# Patient Record
Sex: FEMALE | Race: OTHER | HISPANIC OR LATINO | URBAN - METROPOLITAN AREA
[De-identification: names, ages, dates, MRNs, and addresses within clinical notes are randomized per-mention and may not be internally consistent; named-entity substitution may affect disease eponyms.]

---

## 2019-11-23 ENCOUNTER — INPATIENT (INPATIENT)
Facility: HOSPITAL | Age: 84
LOS: 2 days | Discharge: HOME | DRG: 195 | End: 2019-11-26
Attending: INTERNAL MEDICINE | Admitting: INTERNAL MEDICINE
Payer: COMMERCIAL

## 2019-11-23 VITALS
OXYGEN SATURATION: 94 % | RESPIRATION RATE: 16 BRPM | HEART RATE: 72 BPM | WEIGHT: 186.07 LBS | DIASTOLIC BLOOD PRESSURE: 75 MMHG | TEMPERATURE: 98 F | SYSTOLIC BLOOD PRESSURE: 129 MMHG

## 2019-11-23 DIAGNOSIS — I10 ESSENTIAL (PRIMARY) HYPERTENSION: ICD-10-CM

## 2019-11-23 DIAGNOSIS — Z29.9 ENCOUNTER FOR PROPHYLACTIC MEASURES, UNSPECIFIED: ICD-10-CM

## 2019-11-23 DIAGNOSIS — J18.9 PNEUMONIA, UNSPECIFIED ORGANISM: ICD-10-CM

## 2019-11-23 LAB
ALBUMIN SERPL ELPH-MCNC: 3.3 G/DL — LOW (ref 3.5–5)
ALP SERPL-CCNC: 50 U/L — SIGNIFICANT CHANGE UP (ref 40–120)
ALT FLD-CCNC: 18 U/L DA — SIGNIFICANT CHANGE UP (ref 10–60)
ANION GAP SERPL CALC-SCNC: 7 MMOL/L — SIGNIFICANT CHANGE UP (ref 5–17)
APPEARANCE UR: CLEAR — SIGNIFICANT CHANGE UP
APTT BLD: 31.3 SEC — SIGNIFICANT CHANGE UP (ref 27.5–36.3)
AST SERPL-CCNC: 13 U/L — SIGNIFICANT CHANGE UP (ref 10–40)
BASOPHILS # BLD AUTO: 0.03 K/UL — SIGNIFICANT CHANGE UP (ref 0–0.2)
BASOPHILS NFR BLD AUTO: 0.4 % — SIGNIFICANT CHANGE UP (ref 0–2)
BILIRUB SERPL-MCNC: 0.5 MG/DL — SIGNIFICANT CHANGE UP (ref 0.2–1.2)
BILIRUB UR-MCNC: NEGATIVE — SIGNIFICANT CHANGE UP
BUN SERPL-MCNC: 18 MG/DL — SIGNIFICANT CHANGE UP (ref 7–18)
CALCIUM SERPL-MCNC: 8.9 MG/DL — SIGNIFICANT CHANGE UP (ref 8.4–10.5)
CHLORIDE SERPL-SCNC: 101 MMOL/L — SIGNIFICANT CHANGE UP (ref 96–108)
CO2 SERPL-SCNC: 28 MMOL/L — SIGNIFICANT CHANGE UP (ref 22–31)
COLOR SPEC: YELLOW — SIGNIFICANT CHANGE UP
CREAT SERPL-MCNC: 1.1 MG/DL — SIGNIFICANT CHANGE UP (ref 0.5–1.3)
DIFF PNL FLD: ABNORMAL
EOSINOPHIL # BLD AUTO: 0.09 K/UL — SIGNIFICANT CHANGE UP (ref 0–0.5)
EOSINOPHIL NFR BLD AUTO: 1.2 % — SIGNIFICANT CHANGE UP (ref 0–6)
FLU A RESULT: SIGNIFICANT CHANGE UP
FLU A RESULT: SIGNIFICANT CHANGE UP
FLUAV AG NPH QL: SIGNIFICANT CHANGE UP
FLUBV AG NPH QL: SIGNIFICANT CHANGE UP
GLUCOSE SERPL-MCNC: 92 MG/DL — SIGNIFICANT CHANGE UP (ref 70–99)
GLUCOSE UR QL: NEGATIVE — SIGNIFICANT CHANGE UP
HCT VFR BLD CALC: 34.7 % — SIGNIFICANT CHANGE UP (ref 34.5–45)
HGB BLD-MCNC: 11.8 G/DL — SIGNIFICANT CHANGE UP (ref 11.5–15.5)
IMM GRANULOCYTES NFR BLD AUTO: 0.3 % — SIGNIFICANT CHANGE UP (ref 0–1.5)
INR BLD: 1.18 RATIO — HIGH (ref 0.88–1.16)
KETONES UR-MCNC: NEGATIVE — SIGNIFICANT CHANGE UP
LACTATE SERPL-SCNC: 1.3 MMOL/L — SIGNIFICANT CHANGE UP (ref 0.7–2)
LEUKOCYTE ESTERASE UR-ACNC: NEGATIVE — SIGNIFICANT CHANGE UP
LYMPHOCYTES # BLD AUTO: 1.93 K/UL — SIGNIFICANT CHANGE UP (ref 1–3.3)
LYMPHOCYTES # BLD AUTO: 25.4 % — SIGNIFICANT CHANGE UP (ref 13–44)
MCHC RBC-ENTMCNC: 33.1 PG — SIGNIFICANT CHANGE UP (ref 27–34)
MCHC RBC-ENTMCNC: 34 GM/DL — SIGNIFICANT CHANGE UP (ref 32–36)
MCV RBC AUTO: 97.5 FL — SIGNIFICANT CHANGE UP (ref 80–100)
MONOCYTES # BLD AUTO: 0.79 K/UL — SIGNIFICANT CHANGE UP (ref 0–0.9)
MONOCYTES NFR BLD AUTO: 10.4 % — SIGNIFICANT CHANGE UP (ref 2–14)
NEUTROPHILS # BLD AUTO: 4.74 K/UL — SIGNIFICANT CHANGE UP (ref 1.8–7.4)
NEUTROPHILS NFR BLD AUTO: 62.3 % — SIGNIFICANT CHANGE UP (ref 43–77)
NITRITE UR-MCNC: NEGATIVE — SIGNIFICANT CHANGE UP
NRBC # BLD: 0 /100 WBCS — SIGNIFICANT CHANGE UP (ref 0–0)
PH UR: 7 — SIGNIFICANT CHANGE UP (ref 5–8)
PLATELET # BLD AUTO: 142 K/UL — LOW (ref 150–400)
POTASSIUM SERPL-MCNC: 3.8 MMOL/L — SIGNIFICANT CHANGE UP (ref 3.5–5.3)
POTASSIUM SERPL-SCNC: 3.8 MMOL/L — SIGNIFICANT CHANGE UP (ref 3.5–5.3)
PROT SERPL-MCNC: 6.8 G/DL — SIGNIFICANT CHANGE UP (ref 6–8.3)
PROT UR-MCNC: NEGATIVE — SIGNIFICANT CHANGE UP
PROTHROM AB SERPL-ACNC: 13.2 SEC — HIGH (ref 10–12.9)
RBC # BLD: 3.56 M/UL — LOW (ref 3.8–5.2)
RBC # FLD: 14 % — SIGNIFICANT CHANGE UP (ref 10.3–14.5)
RSV RESULT: DETECTED
RSV RNA RESP QL NAA+PROBE: DETECTED
SODIUM SERPL-SCNC: 136 MMOL/L — SIGNIFICANT CHANGE UP (ref 135–145)
SP GR SPEC: 1.01 — SIGNIFICANT CHANGE UP (ref 1.01–1.02)
UROBILINOGEN FLD QL: NEGATIVE — SIGNIFICANT CHANGE UP
WBC # BLD: 7.6 K/UL — SIGNIFICANT CHANGE UP (ref 3.8–10.5)
WBC # FLD AUTO: 7.6 K/UL — SIGNIFICANT CHANGE UP (ref 3.8–10.5)

## 2019-11-23 PROCEDURE — 99285 EMERGENCY DEPT VISIT HI MDM: CPT

## 2019-11-23 PROCEDURE — 71046 X-RAY EXAM CHEST 2 VIEWS: CPT | Mod: 26

## 2019-11-23 RX ORDER — OXYBUTYNIN CHLORIDE 5 MG
5 TABLET ORAL DAILY
Refills: 0 | Status: DISCONTINUED | OUTPATIENT
Start: 2019-11-23 | End: 2019-11-26

## 2019-11-23 RX ORDER — BENAZEPRIL HYDROCHLORIDE 40 MG/1
0 TABLET ORAL
Qty: 90 | Refills: 0 | DISCHARGE

## 2019-11-23 RX ORDER — OXYBUTYNIN CHLORIDE 5 MG
5 TABLET ORAL DAILY
Refills: 0 | Status: DISCONTINUED | OUTPATIENT
Start: 2019-11-23 | End: 2019-11-23

## 2019-11-23 RX ORDER — BRIMONIDINE TARTRATE 2 MG/MG
0 SOLUTION/ DROPS OPHTHALMIC
Qty: 5 | Refills: 0 | DISCHARGE

## 2019-11-23 RX ORDER — AMLODIPINE BESYLATE 2.5 MG/1
2.5 TABLET ORAL DAILY
Refills: 0 | Status: DISCONTINUED | OUTPATIENT
Start: 2019-11-23 | End: 2019-11-26

## 2019-11-23 RX ORDER — AZITHROMYCIN 500 MG/1
500 TABLET, FILM COATED ORAL ONCE
Refills: 0 | Status: COMPLETED | OUTPATIENT
Start: 2019-11-23 | End: 2019-11-23

## 2019-11-23 RX ORDER — BRIMONIDINE TARTRATE 2 MG/MG
1 SOLUTION/ DROPS OPHTHALMIC DAILY
Refills: 0 | Status: DISCONTINUED | OUTPATIENT
Start: 2019-11-23 | End: 2019-11-26

## 2019-11-23 RX ORDER — HEPARIN SODIUM 5000 [USP'U]/ML
5000 INJECTION INTRAVENOUS; SUBCUTANEOUS EVERY 12 HOURS
Refills: 0 | Status: DISCONTINUED | OUTPATIENT
Start: 2019-11-23 | End: 2019-11-26

## 2019-11-23 RX ORDER — LISINOPRIL 2.5 MG/1
10 TABLET ORAL DAILY
Refills: 0 | Status: DISCONTINUED | OUTPATIENT
Start: 2019-11-23 | End: 2019-11-26

## 2019-11-23 RX ORDER — ALENDRONATE SODIUM 70 MG/1
0 TABLET ORAL
Qty: 4 | Refills: 0 | DISCHARGE

## 2019-11-23 RX ORDER — OXYBUTYNIN CHLORIDE 5 MG
0 TABLET ORAL
Qty: 180 | Refills: 0 | DISCHARGE

## 2019-11-23 RX ORDER — CEFTRIAXONE 500 MG/1
1000 INJECTION, POWDER, FOR SOLUTION INTRAMUSCULAR; INTRAVENOUS EVERY 24 HOURS
Refills: 0 | Status: DISCONTINUED | OUTPATIENT
Start: 2019-11-24 | End: 2019-11-26

## 2019-11-23 RX ORDER — AZITHROMYCIN 500 MG/1
500 TABLET, FILM COATED ORAL EVERY 24 HOURS
Refills: 0 | Status: DISCONTINUED | OUTPATIENT
Start: 2019-11-24 | End: 2019-11-26

## 2019-11-23 RX ORDER — CEFTRIAXONE 500 MG/1
1000 INJECTION, POWDER, FOR SOLUTION INTRAMUSCULAR; INTRAVENOUS ONCE
Refills: 0 | Status: COMPLETED | OUTPATIENT
Start: 2019-11-23 | End: 2019-11-23

## 2019-11-23 RX ADMIN — AZITHROMYCIN 255 MILLIGRAM(S): 500 TABLET, FILM COATED ORAL at 16:20

## 2019-11-23 RX ADMIN — CEFTRIAXONE 100 MILLIGRAM(S): 500 INJECTION, POWDER, FOR SOLUTION INTRAMUSCULAR; INTRAVENOUS at 17:20

## 2019-11-23 NOTE — H&P ADULT - ASSESSMENT
89 F with PMH of HTn, arthritis p/w with difficulty breathing and cough with increased phlegm. She started having cough last week with sputum production, was seen by primary care doctor on tuesday, was prescribed levaquin.    CXR showed age undetermined infiltrates. Might be a resolving Pneumonia.  no leucocytosis, no fever, no tachycardia.  EKG showed RBB.     She is being admitted for Pneumonia. 89 F with PMH of HTn, arthritis p/w with difficulty breathing and cough with increased phlegm. She started having cough last week with sputum production, was seen by primary care doctor on tuesday, was prescribed levaquin.    CXR showed age undetermined infiltrates. Might be a resolving Pneumonia.  no leucocytosis, no fever, no tachycardia.  EKG showed RBB.     She is being admitted for Pneumonia.    GOC: FULL CODE

## 2019-11-23 NOTE — H&P ADULT - NSHPSOCIALHISTORY_GEN_ALL_CORE
Denies smoking, drinking ALcohol, illicit drug use Denies smoking, drinking Alcohol, illicit drug use

## 2019-11-23 NOTE — ED PROVIDER NOTE - CHPI ED SYMPTOMS POS
CHEST CONGESTION/DYSPNEA ON EXERTION/NASAL CONGESTION/COUGH/SHORTNESS OF BREATH/DIFFICULTY BREATHING

## 2019-11-23 NOTE — H&P ADULT - HISTORY OF PRESENT ILLNESS
89 F with PMH of HTn, arthritis p/w with difficulty breathing and cough with increased phlegm. She started having cough last week with sputum production, was seen by primary care doctor on tuesday, was prescribed levaquin. Last night she had SOb, was finding difficulty in breathing. It was associated with increased cough and sputum production. Phlegm is moderate amount and white in color. Has wheezing during breathing and pleuritic chest pain. She is also complaining of dryness inside the nose. She denies fever, chest pain, palpitation, dizziness, headache, burning urination, abdominal pain, nausea, vomiting. 89 F with PMH of HTn, arthritis p/w with difficulty breathing and cough with increased phlegm. She started having cough last week with sputum production, was seen by primary care doctor on tuesday, was prescribed levaquin. Last night she had SOb, was finding difficulty in breathing. It was associated with increased cough and sputum production. Phlegm is moderate amount and white in color. Has wheezing during breathing and pleuritic chest pain. She is also complaining of dryness inside the nose. She denies fever, chest pain, palpitation, dizziness, headache, burning urination, abdominal pain, nausea, vomiting.     Patient is Full code 89 F with PMH of HTN, arthritis p/w with difficulty breathing and cough with increased phlegm. She started having cough last week with sputum production, was seen by primary care doctor on tuesday, was prescribed levaquin. Last night she had SOB, was finding difficulty in breathing. It was associated with increased cough and sputum production. Phlegm is moderate amount and white in color. Has wheezing during breathing and pleuritic chest pain. She is also complaining of dryness inside the nose. She denies fever, palpitation, dizziness, headache, burning urination, abdominal pain, nausea, vomiting.     GOC: Patient is FULL CODE

## 2019-11-23 NOTE — ED PROVIDER NOTE - OBJECTIVE STATEMENT
pt with cough for several days  on levaquin but still coughing and feeling unwell. pt with cough for several days  on levaquin but still coughing and feeling unwell.  feels week and states coughing with phlegm

## 2019-11-23 NOTE — ED PROVIDER NOTE - CLINICAL SUMMARY MEDICAL DECISION MAKING FREE TEXT BOX
pt on levaquin for presumed bronchitis but feeling that she is getting worse, concern for pneumonia  will get xray, labs and admit  family at bedside and aware of plan

## 2019-11-23 NOTE — H&P ADULT - NSHPPHYSICALEXAM_GEN_ALL_CORE
Vital Signs (24 Hrs):  T(C): 36.9 (11-23-19 @ 16:02), Max: 36.9 (11-23-19 @ 16:02)  HR: 66 (11-23-19 @ 16:02) (66 - 72)  BP: 165/65 (11-23-19 @ 16:02) (129/75 - 165/65)  RR: 16 (11-23-19 @ 16:02) (16 - 16)  SpO2: 95% (11-23-19 @ 16:02) (94% - 95%)

## 2019-11-23 NOTE — H&P ADULT - PROBLEM SELECTOR PLAN 3
IMPROVE VTE Individual Risk Assessment    RISK                                                                Points    [  ] Previous VTE                                                  3    [  ] Thrombophilia                                               2    [  ] Lower limb paralysis                                      2        (unable to hold up >15 seconds)      [  ] Current Cancer                                              2         (within 6 months)  [ 1 ] Immobilization > 24 hrs                                1  [  ] ICU/CCU stay > 24 hours                              1  [  1] Age > 60                                                      1  IMPROVE VTE Score _______2__  IMPROVE Score 2-3: At risk, pharmacologic VTE prophylaxis is indicated for most patients (in the absence of a contraindication)  started on sub cut heparin

## 2019-11-23 NOTE — H&P ADULT - PROBLEM SELECTOR PLAN 1
P/W cough and shortness of breath.  Had symptoms since last week and was treated with levaquin as outpatient  No fever, leucocytosis, tachycardia.  CXR showed b/l interstitial prominence.  ? resolving PNA.  Will start on Zithro and rocephin  f/u sputum cultures and blood cultures P/W cough and shortness of breath.  Had symptoms since last week and was treated with levaquin as outpatient  No fever, leucocytosis, tachycardia.  CXR showed b/l interstitial prominence.  ? resolving PNA.  RSV positive, on droplet precaution  Will start on Zithro and rocephin  f/u sputum cultures and blood cultures  Pulm Dr Castañeda P/W cough and shortness of breath.  Had symptoms since last week and was treated with levaquin as outpatient  No fever, leucocytosis, tachycardia.  CXR showed b/l interstitial prominence.  Suspect resolving PNA.  RSV positive, on droplet precaution  Will start on Zithro and rocephin  f/u sputum cultures and blood cultures  Pulm Dr Castañeda

## 2019-11-24 LAB
ALBUMIN SERPL ELPH-MCNC: 3.4 G/DL — LOW (ref 3.5–5)
ALP SERPL-CCNC: 49 U/L — SIGNIFICANT CHANGE UP (ref 40–120)
ALT FLD-CCNC: 17 U/L DA — SIGNIFICANT CHANGE UP (ref 10–60)
ANION GAP SERPL CALC-SCNC: 4 MMOL/L — LOW (ref 5–17)
APPEARANCE UR: CLEAR — SIGNIFICANT CHANGE UP
AST SERPL-CCNC: 10 U/L — SIGNIFICANT CHANGE UP (ref 10–40)
BASOPHILS # BLD AUTO: 0.04 K/UL — SIGNIFICANT CHANGE UP (ref 0–0.2)
BASOPHILS NFR BLD AUTO: 0.6 % — SIGNIFICANT CHANGE UP (ref 0–2)
BILIRUB SERPL-MCNC: 0.7 MG/DL — SIGNIFICANT CHANGE UP (ref 0.2–1.2)
BILIRUB UR-MCNC: NEGATIVE — SIGNIFICANT CHANGE UP
BUN SERPL-MCNC: 18 MG/DL — SIGNIFICANT CHANGE UP (ref 7–18)
CALCIUM SERPL-MCNC: 8.6 MG/DL — SIGNIFICANT CHANGE UP (ref 8.4–10.5)
CHLORIDE SERPL-SCNC: 105 MMOL/L — SIGNIFICANT CHANGE UP (ref 96–108)
CHOLEST SERPL-MCNC: 147 MG/DL — SIGNIFICANT CHANGE UP (ref 10–199)
CO2 SERPL-SCNC: 29 MMOL/L — SIGNIFICANT CHANGE UP (ref 22–31)
COLOR SPEC: YELLOW — SIGNIFICANT CHANGE UP
CREAT SERPL-MCNC: 1 MG/DL — SIGNIFICANT CHANGE UP (ref 0.5–1.3)
CULTURE RESULTS: SIGNIFICANT CHANGE UP
DIFF PNL FLD: NEGATIVE — SIGNIFICANT CHANGE UP
EOSINOPHIL # BLD AUTO: 0.17 K/UL — SIGNIFICANT CHANGE UP (ref 0–0.5)
EOSINOPHIL NFR BLD AUTO: 2.4 % — SIGNIFICANT CHANGE UP (ref 0–6)
ESTIMATED AVERAGE GLUCOSE: 123 MG/DL — HIGH (ref 68–114)
FOLATE SERPL-MCNC: 9.4 NG/ML — SIGNIFICANT CHANGE UP
GLUCOSE SERPL-MCNC: 93 MG/DL — SIGNIFICANT CHANGE UP (ref 70–99)
GLUCOSE UR QL: NEGATIVE — SIGNIFICANT CHANGE UP
GRAM STN FLD: SIGNIFICANT CHANGE UP
HBA1C BLD-MCNC: 5.9 % — HIGH (ref 4–5.6)
HCT VFR BLD CALC: 34.6 % — SIGNIFICANT CHANGE UP (ref 34.5–45)
HDLC SERPL-MCNC: 45 MG/DL — LOW
HGB BLD-MCNC: 11.9 G/DL — SIGNIFICANT CHANGE UP (ref 11.5–15.5)
IMM GRANULOCYTES NFR BLD AUTO: 0.3 % — SIGNIFICANT CHANGE UP (ref 0–1.5)
KETONES UR-MCNC: NEGATIVE — SIGNIFICANT CHANGE UP
LEUKOCYTE ESTERASE UR-ACNC: NEGATIVE — SIGNIFICANT CHANGE UP
LIPID PNL WITH DIRECT LDL SERPL: 86 MG/DL — SIGNIFICANT CHANGE UP
LYMPHOCYTES # BLD AUTO: 2.22 K/UL — SIGNIFICANT CHANGE UP (ref 1–3.3)
LYMPHOCYTES # BLD AUTO: 31.6 % — SIGNIFICANT CHANGE UP (ref 13–44)
MAGNESIUM SERPL-MCNC: 2.2 MG/DL — SIGNIFICANT CHANGE UP (ref 1.6–2.6)
MCHC RBC-ENTMCNC: 33.2 PG — SIGNIFICANT CHANGE UP (ref 27–34)
MCHC RBC-ENTMCNC: 34.4 GM/DL — SIGNIFICANT CHANGE UP (ref 32–36)
MCV RBC AUTO: 96.6 FL — SIGNIFICANT CHANGE UP (ref 80–100)
MONOCYTES # BLD AUTO: 0.69 K/UL — SIGNIFICANT CHANGE UP (ref 0–0.9)
MONOCYTES NFR BLD AUTO: 9.8 % — SIGNIFICANT CHANGE UP (ref 2–14)
NEUTROPHILS # BLD AUTO: 3.88 K/UL — SIGNIFICANT CHANGE UP (ref 1.8–7.4)
NEUTROPHILS NFR BLD AUTO: 55.3 % — SIGNIFICANT CHANGE UP (ref 43–77)
NITRITE UR-MCNC: NEGATIVE — SIGNIFICANT CHANGE UP
NRBC # BLD: 0 /100 WBCS — SIGNIFICANT CHANGE UP (ref 0–0)
PH UR: 6.5 — SIGNIFICANT CHANGE UP (ref 5–8)
PHOSPHATE SERPL-MCNC: 3.1 MG/DL — SIGNIFICANT CHANGE UP (ref 2.5–4.5)
PLATELET # BLD AUTO: 140 K/UL — LOW (ref 150–400)
POTASSIUM SERPL-MCNC: 3.3 MMOL/L — LOW (ref 3.5–5.3)
POTASSIUM SERPL-SCNC: 3.3 MMOL/L — LOW (ref 3.5–5.3)
PROT SERPL-MCNC: 6.6 G/DL — SIGNIFICANT CHANGE UP (ref 6–8.3)
PROT UR-MCNC: NEGATIVE — SIGNIFICANT CHANGE UP
RBC # BLD: 3.58 M/UL — LOW (ref 3.8–5.2)
RBC # FLD: 13.8 % — SIGNIFICANT CHANGE UP (ref 10.3–14.5)
SODIUM SERPL-SCNC: 138 MMOL/L — SIGNIFICANT CHANGE UP (ref 135–145)
SP GR SPEC: 1.01 — SIGNIFICANT CHANGE UP (ref 1.01–1.02)
SPECIMEN SOURCE: SIGNIFICANT CHANGE UP
SPECIMEN SOURCE: SIGNIFICANT CHANGE UP
TOTAL CHOLESTEROL/HDL RATIO MEASUREMENT: 3.3 RATIO — SIGNIFICANT CHANGE UP (ref 3.3–7.1)
TRIGL SERPL-MCNC: 82 MG/DL — SIGNIFICANT CHANGE UP (ref 10–149)
TSH SERPL-MCNC: 3.67 UU/ML — SIGNIFICANT CHANGE UP (ref 0.34–4.82)
UROBILINOGEN FLD QL: NEGATIVE — SIGNIFICANT CHANGE UP
VIT B12 SERPL-MCNC: 393 PG/ML — SIGNIFICANT CHANGE UP (ref 232–1245)
WBC # BLD: 7.02 K/UL — SIGNIFICANT CHANGE UP (ref 3.8–10.5)
WBC # FLD AUTO: 7.02 K/UL — SIGNIFICANT CHANGE UP (ref 3.8–10.5)

## 2019-11-24 RX ORDER — ASPIRIN/CALCIUM CARB/MAGNESIUM 324 MG
81 TABLET ORAL DAILY
Refills: 0 | Status: DISCONTINUED | OUTPATIENT
Start: 2019-11-24 | End: 2019-11-26

## 2019-11-24 RX ORDER — LEVOTHYROXINE SODIUM 125 MCG
150 TABLET ORAL DAILY
Refills: 0 | Status: DISCONTINUED | OUTPATIENT
Start: 2019-11-24 | End: 2019-11-26

## 2019-11-24 RX ADMIN — Medication 5 MILLIGRAM(S): at 11:34

## 2019-11-24 RX ADMIN — CEFTRIAXONE 100 MILLIGRAM(S): 500 INJECTION, POWDER, FOR SOLUTION INTRAMUSCULAR; INTRAVENOUS at 17:43

## 2019-11-24 RX ADMIN — BRIMONIDINE TARTRATE 1 DROP(S): 2 SOLUTION/ DROPS OPHTHALMIC at 11:34

## 2019-11-24 RX ADMIN — AMLODIPINE BESYLATE 2.5 MILLIGRAM(S): 2.5 TABLET ORAL at 06:29

## 2019-11-24 RX ADMIN — AZITHROMYCIN 255 MILLIGRAM(S): 500 TABLET, FILM COATED ORAL at 16:22

## 2019-11-24 RX ADMIN — Medication 81 MILLIGRAM(S): at 13:32

## 2019-11-24 RX ADMIN — LISINOPRIL 10 MILLIGRAM(S): 2.5 TABLET ORAL at 06:29

## 2019-11-24 RX ADMIN — HEPARIN SODIUM 5000 UNIT(S): 5000 INJECTION INTRAVENOUS; SUBCUTANEOUS at 17:43

## 2019-11-24 RX ADMIN — HEPARIN SODIUM 5000 UNIT(S): 5000 INJECTION INTRAVENOUS; SUBCUTANEOUS at 06:29

## 2019-11-24 NOTE — CONSULT NOTE ADULT - SUBJECTIVE AND OBJECTIVE BOX
PULMONARY CONSULT NOTE      ROGER GIL  [id number]    Patient is a 89y old  Female who presents with a chief complaint of Cough and difficulty breathing (2019 07:02)     History of Present Illness:  Reason for Admission: Cough and difficulty breathing	  History of Present Illness: 	  89 F with PMH of HTN, arthritis p/w with difficulty breathing and cough with increased phlegm. She started having cough last week with sputum production, was seen by primary care doctor on tuesday, was prescribed levaquin. Last night she had SOB, was finding difficulty in breathing. It was associated with increased cough and sputum production. Phlegm is moderate amount and white in color. Has wheezing during breathing and pleuritic chest pain. She is also complaining of dryness inside the nose. She denies fever, palpitation, dizziness, headache, burning urination, abdominal pain, nausea, vomiting    HISTORY OF PRESENT ILLNESS:As above ,awake , alert ,lying in bed in NAD.    MEDICATIONS  (STANDING):  amLODIPine   Tablet 2.5 milliGRAM(s) Oral daily  azithromycin  IVPB 500 milliGRAM(s) IV Intermittent every 24 hours  brimonidine 0.2% Ophthalmic Solution 1 Drop(s) Both EYES daily  cefTRIAXone   IVPB 1000 milliGRAM(s) IV Intermittent every 24 hours  heparin  Injectable 5000 Unit(s) SubCutaneous every 12 hours  lisinopril 10 milliGRAM(s) Oral daily  oxybutynin XL 5 milliGRAM(s) Oral daily      MEDICATIONS  (PRN):  guaiFENesin    Syrup 100 milliGRAM(s) Oral every 6 hours PRN Cough      Allergies    sulfa drugs (Hives)    Intolerances        PAST MEDICAL & SURGICAL HISTORY:  Arthritis  Hypertension  No significant past surgical history      FAMILY HISTORY:      SOCIAL HISTORY  Smoking History:     REVIEW OF SYSTEMS:    CONSTITUTIONAL:  No fevers, chills, sweats    HEENT:  Eyes:  No diplopia or blurred vision. ENT:  No earache, sore throat or runny nose.    CARDIOVASCULAR:  No pressure, squeezing, tightness, or heaviness about the chest; no palpitations.    RESPIRATORY:  Per HPI    GASTROINTESTINAL:  No abdominal pain, nausea, vomiting or diarrhea.    GENITOURINARY:  No dysuria, frequency or urgency.    NEUROLOGIC:  No paresthesias, fasciculations, seizures or weakness.    PSYCHIATRIC:  No disorder of thought or mood.    Vital Signs Last 24 Hrs  T(C): 36.7 (2019 08:09), Max: 36.9 (2019 16:02)  T(F): 98 (2019 08:09), Max: 98.4 (2019 16:02)  HR: 60 (2019 08:09) (55 - 72)  BP: 144/45 (2019 08:09) (129/75 - 187/61)  BP(mean): --  RR: 18 (2019 08:09) (16 - 18)  SpO2: 94% (2019 08:09) (94% - 99%)  I&O's Detail      PHYSICAL EXAMINATION:    GENERAL: The patient is a well-developed, well-nourished _____in no apparent distress.     HEENT: Head is normocephalic and atraumatic. Extraocular muscles are intact. Mucous membranes are moist.     NECK: Supple.     LUNGS: + whezes    HEART: Regular rate and rhythm without murmur.    ABDOMEN: Soft, nontender, and nondistended.  No hepatosplenomegaly is noted.    EXTREMITIES: Without any cyanosis, clubbing, rash, lesions or edema.    NEUROLOGIC: Grossly intact.      LABS:                        11.9   7.02  )-----------( 140      ( 2019 07:19 )             34.6     11-24    138  |  105  |  18  ----------------------------<  93  3.3<L>   |  29  |  1.00    Ca    8.6      2019 07:19  Phos  3.1       Mg     2.2         TPro  6.6  /  Alb  3.4<L>  /  TBili  0.7  /  DBili  x   /  AST  10  /  ALT  17  /  AlkPhos  49  11-24    PT/INR - ( 2019 13:28 )   PT: 13.2 sec;   INR: 1.18 ratio         PTT - ( 2019 13:28 )  PTT:31.3 sec  Urinalysis Basic - ( 2019 07:11 )    Color: Yellow / Appearance: Clear / S.010 / pH: x  Gluc: x / Ketone: Negative  / Bili: Negative / Urobili: Negative   Blood: x / Protein: Negative / Nitrite: Negative   Leuk Esterase: Negative / RBC: x / WBC x   Sq Epi: x / Non Sq Epi: x / Bacteria: x                Lactate, Blood: 1.3 mmol/L (19 @ 13:26)        MICROBIOLOGY:    RADIOLOGY & ADDITIONAL STUDIES:    CXR:  < from: Xray Chest 2 Views PA/Lat (19 @ 15:09) >  IMPRESSION: Bilateral interstitial prominence, which may be of an acute   or chronic etiology. Clinical correlation and follow-up suggested.    < end of copied text >    Ct scan chest:    ekg;    echo: PULMONARY CONSULT NOTE      ROGER GIL  MRN-[id number]    Patient is a 89y old  Female who presents with a chief complaint of Cough and difficulty breathing (2019 07:02)     History of Present Illness:  Reason for Admission: Cough and difficulty breathing	  History of Present Illness: 	  89 F with PMH of HTN, arthritis p/w with difficulty breathing and cough with increased phlegm. She started having cough last week with sputum production, was seen by primary care doctor on tuesday, was prescribed levaquin. Last night she had SOB, was finding difficulty in breathing. It was associated with increased cough and sputum production. Phlegm is moderate amount and white in color. Has wheezing during breathing and pleuritic chest pain. She is also complaining of dryness inside the nose. She denies fever, palpitation, dizziness, headache, burning urination, abdominal pain, nausea, vomiting    HISTORY OF PRESENT ILLNESS:As above ,awake , alert , out of bed in NAD.    MEDICATIONS  (STANDING):  amLODIPine   Tablet 2.5 milliGRAM(s) Oral daily  azithromycin  IVPB 500 milliGRAM(s) IV Intermittent every 24 hours  brimonidine 0.2% Ophthalmic Solution 1 Drop(s) Both EYES daily  cefTRIAXone   IVPB 1000 milliGRAM(s) IV Intermittent every 24 hours  heparin  Injectable 5000 Unit(s) SubCutaneous every 12 hours  lisinopril 10 milliGRAM(s) Oral daily  oxybutynin XL 5 milliGRAM(s) Oral daily      MEDICATIONS  (PRN):  guaiFENesin    Syrup 100 milliGRAM(s) Oral every 6 hours PRN Cough      Allergies    sulfa drugs (Hives)    Intolerances        PAST MEDICAL & SURGICAL HISTORY:  Arthritis  Hypertension  No significant past surgical history      FAMILY HISTORY:      SOCIAL HISTORY  Smoking History:     REVIEW OF SYSTEMS:    CONSTITUTIONAL:  No fevers, chills, sweats    HEENT:  Eyes:  No diplopia or blurred vision. ENT:  No earache, sore throat or runny nose.    CARDIOVASCULAR:  No pressure, squeezing, tightness, or heaviness about the chest; no palpitations.    RESPIRATORY:  Per HPI    GASTROINTESTINAL:  No abdominal pain, nausea, vomiting or diarrhea.    GENITOURINARY:  No dysuria, frequency or urgency.    NEUROLOGIC:  No paresthesias, fasciculations, seizures or weakness.    PSYCHIATRIC:  No disorder of thought or mood.    Vital Signs Last 24 Hrs  T(C): 36.7 (2019 08:09), Max: 36.9 (2019 16:02)  T(F): 98 (2019 08:09), Max: 98.4 (2019 16:02)  HR: 60 (2019 08:09) (55 - 72)  BP: 144/45 (2019 08:09) (129/75 - 187/61)  BP(mean): --  RR: 18 (2019 08:09) (16 - 18)  SpO2: 94% (2019 08:09) (94% - 99%)  I&O's Detail      PHYSICAL EXAMINATION:    GENERAL: The patient is a well-developed, well-nourished _____in no apparent distress.     HEENT: Head is normocephalic and atraumatic. Extraocular muscles are intact. Mucous membranes are moist.     NECK: Supple.     LUNGS: + whezes    HEART: Regular rate and rhythm without murmur.    ABDOMEN: Soft, nontender, and nondistended.  No hepatosplenomegaly is noted.    EXTREMITIES: Without any cyanosis, clubbing, rash, lesions or edema.    NEUROLOGIC: Grossly intact.      LABS:                        11.9   7.02  )-----------( 140      ( 2019 07:19 )             34.6     11-24    138  |  105  |  18  ----------------------------<  93  3.3<L>   |  29  |  1.00    Ca    8.6      2019 07:19  Phos  3.1       Mg     2.2         TPro  6.6  /  Alb  3.4<L>  /  TBili  0.7  /  DBili  x   /  AST  10  /  ALT  17  /  AlkPhos  49  11-24    PT/INR - ( 2019 13:28 )   PT: 13.2 sec;   INR: 1.18 ratio         PTT - ( 2019 13:28 )  PTT:31.3 sec  Urinalysis Basic - ( 2019 07:11 )    Color: Yellow / Appearance: Clear / S.010 / pH: x  Gluc: x / Ketone: Negative  / Bili: Negative / Urobili: Negative   Blood: x / Protein: Negative / Nitrite: Negative   Leuk Esterase: Negative / RBC: x / WBC x   Sq Epi: x / Non Sq Epi: x / Bacteria: x                Lactate, Blood: 1.3 mmol/L (19 @ 13:26)        MICROBIOLOGY:    RADIOLOGY & ADDITIONAL STUDIES:    CXR:  < from: Xray Chest 2 Views PA/Lat (19 @ 15:09) >  IMPRESSION: Bilateral interstitial prominence, which may be of an acute   or chronic etiology. Clinical correlation and follow-up suggested.    < end of copied text >    Ct scan chest:    ekg;    echo:

## 2019-11-24 NOTE — PROGRESS NOTE ADULT - SUBJECTIVE AND OBJECTIVE BOX
89 F with PMH of HTN, arthritis p/w with difficulty breathing and cough with increased phlegm. She started having cough last week with sputum production, was seen by primary care doctor on tuesday, was prescribed levaquin. Last night she had SOB, was finding difficulty in breathing. It was associated with increased cough and sputum production. Phlegm is moderate amount and white in color. Has wheezing during breathing and pleuritic chest pain. She is also complaining of dryness inside the nose. She denies fever, palpitation, dizziness, headache, burning urination, abdominal pain, nausea, vomiting.     GOC: Patient is FULL CODE    Review of Systems:  Other Review of Systems: All other review of systems negative, except as noted in HPI      Review of Systems:  · Negative General Symptoms  no fever; no chills; no sweating; no anorexia; no weight loss    · Negative Skin Symptoms  no rash; no itching; no dryness    · Negative Ophthalmologic Symptoms  no diplopia; no photophobia; no lacrimation L    · Negative ENMT Symptoms  no hearing difficulty; no ear pain; no tinnitus; no vertigo    · Negative Respiratory and Thorax Symptoms  no hemoptysis    · Respiratory and Thorax Symptoms  wheezing  dyspnea  cough  pleuritic chest pain    · Negative Cardiovascular Symptoms  no chest pain; no palpitations; no dyspnea on exertion; no orthopnea    · Negative Gastrointestinal Symptoms  no nausea; no vomiting; no diarrhea; no constipation; no change in bowel habits    · Negative General Genitourinary Symptoms  no hematuria; no renal colic; no flank pain L; no flank pain R; no urine discoloration    · Negative Female-Specific Symptoms  no vaginal discharge    · Negative Musculoskeletal Symptoms  no joint swelling    · Musculoskeletal Symptoms  arthralgia; arthritis    · Negative Neurological Symptoms  no transient paralysis; no weakness; no paresthesias; no generalized seizures; no focal seizures    · Negative Psychiatric Symptoms  no suicidal ideation; no depression; no anxiety; no insomnia    · Negative Hematology Symptoms  no gum bleeding; no nose bleeding    · Negative Endocrine Symptoms  no cold intolerance; no heat intolerance    · Negative Allergic Reactions  no anaphylaxis; no respiratory distress; no photosensitivity        pt seen in icu [  ], reg med floor [ x  ], bed [x  ], chair at bedside [   ], a+o x3 [x  ], lethargic [  ],  nad [ x ]      Allergies    sulfa drugs (Hives)        Vitals    T(F): 97.9 (11-24-19 @ 01:14), Max: 98.4 (11-23-19 @ 16:02)  HR: 58 (11-24-19 @ 05:48) (55 - 72)  BP: 158/58 (11-24-19 @ 05:48) (129/75 - 187/61)  RR: 17 (11-24-19 @ 01:14) (16 - 18)  SpO2: 99% (11-24-19 @ 01:14) (94% - 99%)  Wt(kg): --  CAPILLARY BLOOD GLUCOSE          Labs                          11.8   7.60  )-----------( 142      ( 23 Nov 2019 13:28 )             34.7       11-23    136  |  101  |  18  ----------------------------<  92  3.8   |  28  |  1.10    Ca    8.9      23 Nov 2019 13:28    TPro  6.8  /  Alb  3.3<L>  /  TBili  0.5  /  DBili  x   /  AST  13  /  ALT  18  /  AlkPhos  50  11-23                Radiology Results      Meds    MEDICATIONS  (STANDING):  amLODIPine   Tablet 2.5 milliGRAM(s) Oral daily  azithromycin  IVPB 500 milliGRAM(s) IV Intermittent every 24 hours  brimonidine 0.2% Ophthalmic Solution 1 Drop(s) Both EYES daily  cefTRIAXone   IVPB 1000 milliGRAM(s) IV Intermittent every 24 hours  heparin  Injectable 5000 Unit(s) SubCutaneous every 12 hours  lisinopril 10 milliGRAM(s) Oral daily  oxybutynin XL 5 milliGRAM(s) Oral daily      MEDICATIONS  (PRN):  guaiFENesin    Syrup 100 milliGRAM(s) Oral every 6 hours PRN Cough      Physical Exam    Neuro :  no focal deficits  Respiratory:  B/L wheeze and crackles  CV: RRR, S1S2, no murmurs,   Abdominal: Soft, NT, ND +BS,  Extremities: No edema, + peripheral pulses    ASSESSMENT    Pneumonia due to organism failed outpt tx  rsv infxn  h/o Arthritis  Hypertension        PLAN      cont roceph and zith  bronchodilators  f/u blood cx  pulm cons  cont sup O2  cont current meds

## 2019-11-25 ENCOUNTER — TRANSCRIPTION ENCOUNTER (OUTPATIENT)
Age: 84
End: 2019-11-25

## 2019-11-25 LAB
NIGHT BLUE STAIN TISS: SIGNIFICANT CHANGE UP
SPECIMEN SOURCE: SIGNIFICANT CHANGE UP

## 2019-11-25 RX ORDER — LISINOPRIL 2.5 MG/1
1 TABLET ORAL
Qty: 0 | Refills: 0 | DISCHARGE
Start: 2019-11-25

## 2019-11-25 RX ORDER — AMLODIPINE BESYLATE 2.5 MG/1
1 TABLET ORAL
Qty: 0 | Refills: 0 | DISCHARGE
Start: 2019-11-25

## 2019-11-25 RX ORDER — LEVOTHYROXINE SODIUM 125 MCG
1 TABLET ORAL
Qty: 0 | Refills: 0 | DISCHARGE
Start: 2019-11-25

## 2019-11-25 RX ORDER — ASPIRIN/CALCIUM CARB/MAGNESIUM 324 MG
1 TABLET ORAL
Qty: 0 | Refills: 0 | DISCHARGE
Start: 2019-11-25

## 2019-11-25 RX ORDER — AMLODIPINE BESYLATE 2.5 MG/1
0 TABLET ORAL
Qty: 90 | Refills: 0 | DISCHARGE

## 2019-11-25 RX ADMIN — Medication 81 MILLIGRAM(S): at 11:22

## 2019-11-25 RX ADMIN — HEPARIN SODIUM 5000 UNIT(S): 5000 INJECTION INTRAVENOUS; SUBCUTANEOUS at 06:16

## 2019-11-25 RX ADMIN — AZITHROMYCIN 255 MILLIGRAM(S): 500 TABLET, FILM COATED ORAL at 17:26

## 2019-11-25 RX ADMIN — Medication 150 MICROGRAM(S): at 06:16

## 2019-11-25 RX ADMIN — Medication 5 MILLIGRAM(S): at 11:22

## 2019-11-25 RX ADMIN — BRIMONIDINE TARTRATE 1 DROP(S): 2 SOLUTION/ DROPS OPHTHALMIC at 11:23

## 2019-11-25 RX ADMIN — HEPARIN SODIUM 5000 UNIT(S): 5000 INJECTION INTRAVENOUS; SUBCUTANEOUS at 17:26

## 2019-11-25 RX ADMIN — CEFTRIAXONE 100 MILLIGRAM(S): 500 INJECTION, POWDER, FOR SOLUTION INTRAMUSCULAR; INTRAVENOUS at 18:57

## 2019-11-25 NOTE — PROGRESS NOTE ADULT - SUBJECTIVE AND OBJECTIVE BOX
Patient is awake, alert, lying in bed in NAD. Still with some right shoulder pain. Otherwise feels better.     INTERVAL HPI/OVERNIGHT EVENTS:      VITAL SIGNS:  T(F): 97.8 (19 @ 08:00)  HR: 68 (19 @ 08:00)  BP: 105/56 (19 @ 08:00)  RR: 18 (19 @ 08:00)  SpO2: 95% (19 @ 08:00)  Wt(kg): --  I&O's Detail    REVIEW OF SYSTEMS:    CONSTITUTIONAL:  No fevers, chills, sweats    HEENT:  Eyes:  No diplopia or blurred vision. ENT:  No earache, sore throat or runny nose.    CARDIOVASCULAR:  No pressure, squeezing, tightness, or heaviness about the chest; no palpitations.    RESPIRATORY:  Per HPI    GASTROINTESTINAL:  No abdominal pain, nausea, vomiting or diarrhea.    GENITOURINARY:  No dysuria, frequency or urgency.    NEUROLOGIC:  No paresthesias, fasciculations, seizures or weakness.    PSYCHIATRIC:  No disorder of thought or mood.      PHYSICAL EXAM:    Constitutional: Well developed and nourished  Eyes:Perrla  ENMT: normal  Neck:supple  Respiratory: good air entry  Cardiovascular: S1 S2 regular  Gastrointestinal: Soft, Non tender  Extremities: right shoulder discomfort.   Vascular:normal  Neurological:Awake, alert,Ox3  Musculoskeletal:Normal      MEDICATIONS  (STANDING):  amLODIPine   Tablet 2.5 milliGRAM(s) Oral daily  aspirin  chewable 81 milliGRAM(s) Oral daily  azithromycin  IVPB 500 milliGRAM(s) IV Intermittent every 24 hours  brimonidine 0.2% Ophthalmic Solution 1 Drop(s) Both EYES daily  cefTRIAXone   IVPB 1000 milliGRAM(s) IV Intermittent every 24 hours  heparin  Injectable 5000 Unit(s) SubCutaneous every 12 hours  levothyroxine 150 MICROGram(s) Oral daily  lisinopril 10 milliGRAM(s) Oral daily  oxybutynin XL 5 milliGRAM(s) Oral daily    MEDICATIONS  (PRN):  guaiFENesin    Syrup 100 milliGRAM(s) Oral every 6 hours PRN Cough      Allergies    sulfa drugs (Hives)    Intolerances      LABS:                        11.9   7.02  )-----------( 140      ( 2019 07:19 )             34.6     11-    138  |  105  |  18  ----------------------------<  93  3.3<L>   |  29  |  1.00    Ca    8.6      2019 07:19  Phos  3.1       Mg     2.2         TPro  6.6  /  Alb  3.4<L>  /  TBili  0.7  /  DBili  x   /  AST  10  /  ALT  17  /  AlkPhos  49      PT/INR - ( 2019 13:28 )   PT: 13.2 sec;   INR: 1.18 ratio         PTT - ( 2019 13:28 )  PTT:31.3 sec  Urinalysis Basic - ( 2019 07:11 )    Color: Yellow / Appearance: Clear / S.010 / pH: x  Gluc: x / Ketone: Negative  / Bili: Negative / Urobili: Negative   Blood: x / Protein: Negative / Nitrite: Negative   Leuk Esterase: Negative / RBC: x / WBC x   Sq Epi: x / Non Sq Epi: x / Bacteria: x    CAPILLARY BLOOD GLUCOSE    RADIOLOGY & ADDITIONAL TESTS:    CXR:  < from: Xray Chest 2 Views PA/Lat (19 @ 15:09) >  IMPRESSION: Bilateral interstitial prominence, which may be of an acute   or chronic etiology. Clinical correlation and follow-up suggested.    < end of copied text >    Ct scan chest:    ekg;    echo:

## 2019-11-25 NOTE — PHYSICAL THERAPY INITIAL EVALUATION ADULT - GENERAL OBSERVATIONS, REHAB EVAL
Consult received,EMR, radiology and labs reviewed. Patient received OOb in a chair, son at bedside. Pt is on droplet precaution. Patient agreed to EVALUATION from Physical Therapist.

## 2019-11-25 NOTE — PROGRESS NOTE ADULT - SUBJECTIVE AND OBJECTIVE BOX
PGY 1 Note discussed with primary attending    Patient is a 89y old  Female who presents with a chief complaint of Cough and difficulty breathing (2019 11:04)      INTERVAL HPI/OVERNIGHT EVENTS: offers no new complaints; current symptoms resolving    MEDICATIONS  (STANDING):  amLODIPine   Tablet 2.5 milliGRAM(s) Oral daily  aspirin  chewable 81 milliGRAM(s) Oral daily  azithromycin  IVPB 500 milliGRAM(s) IV Intermittent every 24 hours  brimonidine 0.2% Ophthalmic Solution 1 Drop(s) Both EYES daily  cefTRIAXone   IVPB 1000 milliGRAM(s) IV Intermittent every 24 hours  heparin  Injectable 5000 Unit(s) SubCutaneous every 12 hours  levothyroxine 150 MICROGram(s) Oral daily  lisinopril 10 milliGRAM(s) Oral daily  oxybutynin XL 5 milliGRAM(s) Oral daily    MEDICATIONS  (PRN):  guaiFENesin    Syrup 100 milliGRAM(s) Oral every 6 hours PRN Cough      __________________________________________________  REVIEW OF SYSTEMS:    CONSTITUTIONAL: No fever,   EYES: no acute visual disturbances  NECK: No pain or stiffness  RESPIRATORY: No cough; No shortness of breath  CARDIOVASCULAR: No chest pain, no palpitations  GASTROINTESTINAL: No pain. No nausea or vomiting; No diarrhea   NEUROLOGICAL: No headache or numbness, no tremors  MUSCULOSKELETAL: No joint pain, no muscle pain  GENITOURINARY: no dysuria, no frequency, no hesitancy  PSYCHIATRY: no depression , no anxiety  ALL OTHER  ROS negative        Vital Signs Last 24 Hrs  T(C): 36.6 (2019 08:00), Max: 36.7 (2019 15:43)  T(F): 97.8 (2019 08:00), Max: 98 (2019 15:43)  HR: 68 (2019 08:00) (52 - 68)  BP: 105/56 (2019 08:00) (105/56 - 138/49)  BP(mean): --  RR: 18 (2019 08:00) (18 - 18)  SpO2: 95% (2019 08:00) (95% - 96%)    ________________________________________________  PHYSICAL EXAM:  GENERAL: NAD  HEENT: Normocephalic;  conjunctivae and sclerae clear; moist mucous membranes;   NECK : supple  CHEST/LUNG: Clear to auscultation bilaterally with good air entry   HEART: S1 S2  regular; no murmurs, gallops or rubs  ABDOMEN: Soft, Nontender, Nondistended; Bowel sounds present  EXTREMITIES: no cyanosis; no edema; no calf tenderness  SKIN: warm and dry; no rash  NERVOUS SYSTEM:  Awake and alert; Oriented  to place, person and time ; no new deficits    _________________________________________________  LABS:                        11.9   7.02  )-----------( 140      ( 2019 07:19 )             34.6         138  |  105  |  18  ----------------------------<  93  3.3<L>   |  29  |  1.00    Ca    8.6      2019 07:19  Phos  3.1       Mg     2.2         TPro  6.6  /  Alb  3.4<L>  /  TBili  0.7  /  DBili  x   /  AST  10  /  ALT  17  /  AlkPhos  49      PT/INR - ( 2019 13:28 )   PT: 13.2 sec;   INR: 1.18 ratio         PTT - ( 2019 13:28 )  PTT:31.3 sec  Urinalysis Basic - ( 2019 07:11 )    Color: Yellow / Appearance: Clear / S.010 / pH: x  Gluc: x / Ketone: Negative  / Bili: Negative / Urobili: Negative   Blood: x / Protein: Negative / Nitrite: Negative   Leuk Esterase: Negative / RBC: x / WBC x   Sq Epi: x / Non Sq Epi: x / Bacteria: x      CAPILLARY BLOOD GLUCOSE            RADIOLOGY & ADDITIONAL TESTS:    Imaging Personally Reviewed:  YES/NO    Consultant(s) Notes Reviewed:   YES/ No    Care Discussed with Consultants :     Plan of care was discussed with patient and /or primary care giver; all questions and concerns were addressed and care was aligned with patient's wishes. PGY 1 Note discussed with primary attending    Patient is a 89y old  Female who presents with a chief complaint of Cough and difficulty breathing (2019 11:04)      INTERVAL HPI/OVERNIGHT EVENTS: offers no new complaints; current symptoms resolving    MEDICATIONS  (STANDING):  amLODIPine   Tablet 2.5 milliGRAM(s) Oral daily  aspirin  chewable 81 milliGRAM(s) Oral daily  azithromycin  IVPB 500 milliGRAM(s) IV Intermittent every 24 hours  brimonidine 0.2% Ophthalmic Solution 1 Drop(s) Both EYES daily  cefTRIAXone   IVPB 1000 milliGRAM(s) IV Intermittent every 24 hours  heparin  Injectable 5000 Unit(s) SubCutaneous every 12 hours  levothyroxine 150 MICROGram(s) Oral daily  lisinopril 10 milliGRAM(s) Oral daily  oxybutynin XL 5 milliGRAM(s) Oral daily    MEDICATIONS  (PRN):  guaiFENesin    Syrup 100 milliGRAM(s) Oral every 6 hours PRN Cough      __________________________________________________  REVIEW OF SYSTEMS:    CONSTITUTIONAL: No fever,   EYES: no acute visual disturbances  NECK: No pain or stiffness  RESPIRATORY: No cough; No shortness of breath  CARDIOVASCULAR: No chest pain, no palpitations  GASTROINTESTINAL: No pain. No nausea or vomiting; No diarrhea   NEUROLOGICAL: No headache or numbness, no tremors  MUSCULOSKELETAL: No joint pain, no muscle pain  GENITOURINARY: no dysuria, no frequency, no hesitancy  PSYCHIATRY: no depression , no anxiety  ALL OTHER  ROS negative        Vital Signs Last 24 Hrs  T(C): 36.6 (2019 08:00), Max: 36.7 (2019 15:43)  T(F): 97.8 (2019 08:00), Max: 98 (2019 15:43)  HR: 68 (2019 08:00) (52 - 68)  BP: 105/56 (2019 08:00) (105/56 - 138/49)  BP(mean): --  RR: 18 (2019 08:00) (18 - 18)  SpO2: 95% (2019 08:00) (95% - 96%)    ________________________________________________  PHYSICAL EXAM:  GENERAL: NAD  HEENT: Normocephalic;  conjunctivae and sclerae clear; moist mucous membranes;   NECK : supple  CHEST/LUNG: Mild crackles on bases  HEART: S1 S2  regular; no murmurs, gallops or rubs  ABDOMEN: Soft, Nontender, Nondistended; Bowel sounds present  EXTREMITIES: no cyanosis; no edema; no calf tenderness  SKIN: warm and dry; no rash  NERVOUS SYSTEM:  Awake and alert; Oriented  to place, person and time ; no new deficits    _________________________________________________  LABS:                        11.9   7.02  )-----------( 140      ( 2019 07:19 )             34.6         138  |  105  |  18  ----------------------------<  93  3.3<L>   |  29  |  1.00    Ca    8.6      2019 07:19  Phos  3.1       Mg     2.2         TPro  6.6  /  Alb  3.4<L>  /  TBili  0.7  /  DBili  x   /  AST  10  /  ALT  17  /  AlkPhos  49      PT/INR - ( 2019 13:28 )   PT: 13.2 sec;   INR: 1.18 ratio         PTT - ( 2019 13:28 )  PTT:31.3 sec  Urinalysis Basic - ( 2019 07:11 )    Color: Yellow / Appearance: Clear / S.010 / pH: x  Gluc: x / Ketone: Negative  / Bili: Negative / Urobili: Negative   Blood: x / Protein: Negative / Nitrite: Negative   Leuk Esterase: Negative / RBC: x / WBC x   Sq Epi: x / Non Sq Epi: x / Bacteria: x      CAPILLARY BLOOD GLUCOSE            RADIOLOGY & ADDITIONAL TESTS:    Imaging Personally Reviewed:  YES/NO    Consultant(s) Notes Reviewed:   YES/ No    Care Discussed with Consultants :     Plan of care was discussed with patient and /or primary care giver; all questions and concerns were addressed and care was aligned with patient's wishes.

## 2019-11-25 NOTE — DISCHARGE NOTE PROVIDER - NSDCCPCAREPLAN_GEN_ALL_CORE_FT
PRINCIPAL DISCHARGE DIAGNOSIS  Diagnosis: Pneumonia  Assessment and Plan of Treatment: You presented with weakness. Your CXR shows "Patchy opacities identified within the left lower lung field, suggesting left-sided pneumonia". You were treated with rocephin and zithromax. Please take your medication regularly. Follow up with your doctor in 1 week.  PFTs outpatient      SECONDARY DISCHARGE DIAGNOSES  Diagnosis: Hypertension  Assessment and Plan of Treatment: Your blood pressure was well-controlled during your admission. Continue with your current regimen of anti-hypertensive medications as mentioned in your discharge summary and ensure that you follow up with your primary care provider for further recommendations and monitoring. PRINCIPAL DISCHARGE DIAGNOSIS  Diagnosis: Pneumonia  Assessment and Plan of Treatment: You presented with weakness. Your CXR shows Bilateral instersitital infiltrates suggesting old resolving pneumonia. You were treated with IV rocephin and zithromax. You also have viral panel positive for one of the virus. We have changed antibiotics to oral. Please take your medication regularly. Follow up with your doctor in 1 week.  PFTs outpatient      SECONDARY DISCHARGE DIAGNOSES  Diagnosis: Hypertension  Assessment and Plan of Treatment: Your blood pressure was well-controlled during your admission. Continue with your current regimen of anti-hypertensive medications as mentioned in your discharge summary and ensure that you follow up with your primary care provider for further recommendations and monitoring.

## 2019-11-25 NOTE — PROGRESS NOTE ADULT - SUBJECTIVE AND OBJECTIVE BOX
Patient is a 89y old  Female who presents with a chief complaint of Cough and difficulty breathing (25 Nov 2019 09:03)    pt seen in icu [  ], reg med floor [   ], bed [  ], chair at bedside [   ], a+o x3 [  ], lethargic [  ],  nad [  ]    rock [  ], ngt [  ], peg [  ], et tube [  ], cent line [  ], picc line [  ]        Allergies    sulfa drugs (Hives)        Vitals    T(F): 97.8 (11-25-19 @ 08:00), Max: 98 (11-24-19 @ 15:43)  HR: 68 (11-25-19 @ 08:00) (52 - 68)  BP: 105/56 (11-25-19 @ 08:00) (105/56 - 138/49)  RR: 18 (11-25-19 @ 08:00) (18 - 18)  SpO2: 95% (11-25-19 @ 08:00) (95% - 96%)  Wt(kg): --  CAPILLARY BLOOD GLUCOSE          Labs                          11.9   7.02  )-----------( 140      ( 24 Nov 2019 07:19 )             34.6       11-24    138  |  105  |  18  ----------------------------<  93  3.3<L>   |  29  |  1.00    Ca    8.6      24 Nov 2019 07:19  Phos  3.1     11-24  Mg     2.2     11-24    TPro  6.6  /  Alb  3.4<L>  /  TBili  0.7  /  DBili  x   /  AST  10  /  ALT  17  /  AlkPhos  49  11-24            .Sputum cup  11-24 @ 13:05   Normal Respiratory Macey present  --    No polymorphonuclear leukocytes per low power field  Few Squamous epithelial cells per low power field  Numerous Gram positive cocci in pairs, chains and clusters per oil power  field  Few Gram Negative Rods per oil power field  Few Gram Negative Diplococci per oil power field      .Urine  11-23 @ 22:21   <10,000 CFU/mL Normal Urogenital Macey  --  --      .Blood  11-23 @ 22:17   No growth to date.  --  --          Radiology Results      Meds    MEDICATIONS  (STANDING):  amLODIPine   Tablet 2.5 milliGRAM(s) Oral daily  aspirin  chewable 81 milliGRAM(s) Oral daily  azithromycin  IVPB 500 milliGRAM(s) IV Intermittent every 24 hours  brimonidine 0.2% Ophthalmic Solution 1 Drop(s) Both EYES daily  cefTRIAXone   IVPB 1000 milliGRAM(s) IV Intermittent every 24 hours  heparin  Injectable 5000 Unit(s) SubCutaneous every 12 hours  levothyroxine 150 MICROGram(s) Oral daily  lisinopril 10 milliGRAM(s) Oral daily  oxybutynin XL 5 milliGRAM(s) Oral daily      MEDICATIONS  (PRN):  guaiFENesin    Syrup 100 milliGRAM(s) Oral every 6 hours PRN Cough      Physical Exam    Neuro :  no focal deficits  Respiratory: CTA B/L  CV: RRR, S1S2, no murmurs,   Abdominal: Soft, NT, ND +BS,  Extremities: No edema, + peripheral pulses    ASSESSMENT    Pneumonia due to organism  Arthritis  Hypertension  No pertinent past medical history  No significant past surgical history      PLAN    d/c plan for am if stable Patient is a 89y old  Female who presents with a chief complaint of Cough and difficulty breathing (25 Nov 2019 09:03)    pt seen in icu [  ], reg med floor [ x  ], bed [x  ], chair at bedside [   ], a+o x3 [x  ], lethargic [  ],  nad [ x ]      Allergies    sulfa drugs (Hives)        Vitals    T(F): 97.8 (11-25-19 @ 08:00), Max: 98 (11-24-19 @ 15:43)  HR: 68 (11-25-19 @ 08:00) (52 - 68)  BP: 105/56 (11-25-19 @ 08:00) (105/56 - 138/49)  RR: 18 (11-25-19 @ 08:00) (18 - 18)  SpO2: 95% (11-25-19 @ 08:00) (95% - 96%)  Wt(kg): --  CAPILLARY BLOOD GLUCOSE          Labs                          11.9   7.02  )-----------( 140      ( 24 Nov 2019 07:19 )             34.6       11-24    138  |  105  |  18  ----------------------------<  93  3.3<L>   |  29  |  1.00    Ca    8.6      24 Nov 2019 07:19  Phos  3.1     11-24  Mg     2.2     11-24    TPro  6.6  /  Alb  3.4<L>  /  TBili  0.7  /  DBili  x   /  AST  10  /  ALT  17  /  AlkPhos  49  11-24            .Sputum cup  11-24 @ 13:05   Normal Respiratory Macey present  --    No polymorphonuclear leukocytes per low power field  Few Squamous epithelial cells per low power field  Numerous Gram positive cocci in pairs, chains and clusters per oil power  field  Few Gram Negative Rods per oil power field  Few Gram Negative Diplococci per oil power field      .Urine  11-23 @ 22:21   <10,000 CFU/mL Normal Urogenital Macey  --  --      .Blood  11-23 @ 22:17   No growth to date.  --  --          Radiology Results      Meds    MEDICATIONS  (STANDING):  amLODIPine   Tablet 2.5 milliGRAM(s) Oral daily  aspirin  chewable 81 milliGRAM(s) Oral daily  azithromycin  IVPB 500 milliGRAM(s) IV Intermittent every 24 hours  brimonidine 0.2% Ophthalmic Solution 1 Drop(s) Both EYES daily  cefTRIAXone   IVPB 1000 milliGRAM(s) IV Intermittent every 24 hours  heparin  Injectable 5000 Unit(s) SubCutaneous every 12 hours  levothyroxine 150 MICROGram(s) Oral daily  lisinopril 10 milliGRAM(s) Oral daily  oxybutynin XL 5 milliGRAM(s) Oral daily      MEDICATIONS  (PRN):  guaiFENesin    Syrup 100 milliGRAM(s) Oral every 6 hours PRN Cough      Physical Exam    Neuro :  no focal deficits  Respiratory:  B/L lower lobecrackles  CV: RRR, S1S2, no murmurs,   Abdominal: Soft, NT, ND +BS,  Extremities: No edema, + peripheral pulses    ASSESSMENT    Pneumonia due to organism failed outpt tx  rsv infxn  h/o Arthritis  Hypertension        PLAN      cont roceph and zith  bronchodilators  blood cx neg noted above  pulm f/u   cont sup O2  pft outpt  cont current meds    d/c plan for am if stable Patient is a 89y old  Female who presents with a chief complaint of Cough and difficulty breathing (25 Nov 2019 09:03)    pt seen in icu [  ], reg med floor [ x  ], bed [x  ], chair at bedside [   ], a+o x3 [x  ], lethargic [  ],  nad [ x ]      Allergies    sulfa drugs (Hives)        Vitals    T(F): 97.8 (11-25-19 @ 08:00), Max: 98 (11-24-19 @ 15:43)  HR: 68 (11-25-19 @ 08:00) (52 - 68)  BP: 105/56 (11-25-19 @ 08:00) (105/56 - 138/49)  RR: 18 (11-25-19 @ 08:00) (18 - 18)  SpO2: 95% (11-25-19 @ 08:00) (95% - 96%)  Wt(kg): --  CAPILLARY BLOOD GLUCOSE          Labs                          11.9   7.02  )-----------( 140      ( 24 Nov 2019 07:19 )             34.6       11-24    138  |  105  |  18  ----------------------------<  93  3.3<L>   |  29  |  1.00    Ca    8.6      24 Nov 2019 07:19  Phos  3.1     11-24  Mg     2.2     11-24    TPro  6.6  /  Alb  3.4<L>  /  TBili  0.7  /  DBili  x   /  AST  10  /  ALT  17  /  AlkPhos  49  11-24            .Sputum cup  11-24 @ 13:05   Normal Respiratory Macey present  --    No polymorphonuclear leukocytes per low power field  Few Squamous epithelial cells per low power field  Numerous Gram positive cocci in pairs, chains and clusters per oil power  field  Few Gram Negative Rods per oil power field  Few Gram Negative Diplococci per oil power field      .Urine  11-23 @ 22:21   <10,000 CFU/mL Normal Urogenital Macey  --  --      .Blood  11-23 @ 22:17   No growth to date.  --  --          Radiology Results      Meds    MEDICATIONS  (STANDING):  amLODIPine   Tablet 2.5 milliGRAM(s) Oral daily  aspirin  chewable 81 milliGRAM(s) Oral daily  azithromycin  IVPB 500 milliGRAM(s) IV Intermittent every 24 hours  brimonidine 0.2% Ophthalmic Solution 1 Drop(s) Both EYES daily  cefTRIAXone   IVPB 1000 milliGRAM(s) IV Intermittent every 24 hours  heparin  Injectable 5000 Unit(s) SubCutaneous every 12 hours  levothyroxine 150 MICROGram(s) Oral daily  lisinopril 10 milliGRAM(s) Oral daily  oxybutynin XL 5 milliGRAM(s) Oral daily      MEDICATIONS  (PRN):  guaiFENesin    Syrup 100 milliGRAM(s) Oral every 6 hours PRN Cough      Physical Exam    Neuro :  no focal deficits  Respiratory:  B/L lower lobecrackles  CV: RRR, S1S2, no murmurs,   Abdominal: Soft, NT, ND +BS,  Extremities: No edema, + peripheral pulses    ASSESSMENT    Pneumonia due to organism failed outpt tx  rsv infxn  h/o Arthritis  Hypertension        PLAN      cont roceph and zith  bronchodilators  blood cx neg noted above  pulm f/u   cont sup O2  pft outpt  cont current meds  d/c plan for am if stable

## 2019-11-25 NOTE — PROGRESS NOTE ADULT - PROBLEM SELECTOR PLAN 1
P/W cough and shortness of breath.  Had symptoms since last week and was treated with levaquin as outpatient  No fever, leucocytosis, tachycardia.  CXR showed b/l interstitial prominence.  Suspect resolving PNA.  RSV positive, on droplet precaution  Will start on Zithro and rocephin  f/u sputum cultures and blood cultures  Pulm Dr Castañeda Pneumonia is resolving  No fever, leucocytosis, tachycardia.  CXR showed b/l interstitial prominence.  Suspect resolving PNA.  RSV positive, on droplet precaution  Will start on Zithro and rocephin  Blood cultures negative, Sputum cultures show normal resp clau,   Pulm Dr Castañeda consult noted  f/u PT evaluation

## 2019-11-25 NOTE — DISCHARGE NOTE PROVIDER - CARE PROVIDER_API CALL
Fox Castañeda)  Internal Medicine; Pulmonary Disease  [address] [place] [address]  Phone: [phone]  Fax: [phone]  Follow Up Time:     Nura De Jesus)  Internal Medicine  [address] [place] [address]  Phone: [phone]  Fax: [phone]  Follow Up Time:

## 2019-11-25 NOTE — DISCHARGE NOTE PROVIDER - HOSPITAL COURSE
89 F with PMH of HTN, arthritis p/w with difficulty breathing and cough with increased phlegm. She started having cough last week with sputum production, was seen by primary care doctor on tuesday, was prescribed levaquin. Last night she had SOB, was finding difficulty in breathing. It was associated with increased cough and sputum production. Phlegm is moderate amount and white in color. Has wheezing during breathing and pleuritic chest pain. She is also complaining of dryness inside the nose. She denies fever, palpitation, dizziness, headache, burning urination, abdominal pain, nausea, vomiting. 89 F with PMH of HTN, arthritis p/w with difficulty breathing and cough with increased phlegm. She started having cough last week with sputum production, was seen by primary care doctor on tuesday, was prescribed levaquin. Last night she had SOB, was finding difficulty in breathing. It was associated with increased cough and sputum production. Phlegm is moderate amount and white in color. pt had wheezing during breathing and pleuritic chest pain. She was also complaining of dryness inside the nose. She denies fever, palpitation, dizziness, headache, burning urination, abdominal pain, nausea, vomiting.     CXR showed some infiltrates. pt was started on IV rocephin and zithromax. RSV was positive so pt was put in droplet precaution.     PT was evaluated by PT who recommended d/C home without skilled PT need    Pt is stable to be discharged back home.

## 2019-11-25 NOTE — DISCHARGE NOTE PROVIDER - NSDCMRMEDTOKEN_GEN_ALL_CORE_FT
ALENDRONATE  TAB 70MG:   amLODIPine 2.5 mg oral tablet: 1 tab(s) orally once a day  aspirin 81 mg oral tablet, chewable: 1 tab(s) orally once a day  BENAZEPRIL   TAB 10MG:   BRIMONIDINE  SOL 0.2% OP:   levothyroxine 150 mcg (0.15 mg) oral tablet: 1 tab(s) orally once a day  OXYBUTYNIN   TAB 5MG: ALENDRONATE  TAB 70MG:   amLODIPine 2.5 mg oral tablet: 1 tab(s) orally once a day  aspirin 81 mg oral tablet, chewable: 1 tab(s) orally once a day  BENAZEPRIL   TAB 10MG:   BRIMONIDINE  SOL 0.2% OP:   cefuroxime 500 mg oral tablet: 1 tab(s) orally once a day   guaiFENesin 100 mg/5 mL oral liquid: 5 milliliter(s) orally every 6 hours, As needed, Cough  levothyroxine 150 mcg (0.15 mg) oral tablet: 1 tab(s) orally once a day  OXYBUTYNIN   TAB 5MG:   Zithromax 500 mg oral tablet: 1 tab(s) orally once a day

## 2019-11-26 VITALS
SYSTOLIC BLOOD PRESSURE: 155 MMHG | TEMPERATURE: 98 F | DIASTOLIC BLOOD PRESSURE: 44 MMHG | RESPIRATION RATE: 16 BRPM | OXYGEN SATURATION: 97 % | HEART RATE: 56 BPM

## 2019-11-26 LAB
CULTURE RESULTS: SIGNIFICANT CHANGE UP
SPECIMEN SOURCE: SIGNIFICANT CHANGE UP

## 2019-11-26 PROCEDURE — 87631 RESP VIRUS 3-5 TARGETS: CPT

## 2019-11-26 PROCEDURE — 71046 X-RAY EXAM CHEST 2 VIEWS: CPT

## 2019-11-26 PROCEDURE — 83036 HEMOGLOBIN GLYCOSYLATED A1C: CPT

## 2019-11-26 PROCEDURE — 87070 CULTURE OTHR SPECIMN AEROBIC: CPT

## 2019-11-26 PROCEDURE — 87040 BLOOD CULTURE FOR BACTERIA: CPT

## 2019-11-26 PROCEDURE — 82607 VITAMIN B-12: CPT

## 2019-11-26 PROCEDURE — 93005 ELECTROCARDIOGRAM TRACING: CPT

## 2019-11-26 PROCEDURE — 84100 ASSAY OF PHOSPHORUS: CPT

## 2019-11-26 PROCEDURE — 85027 COMPLETE CBC AUTOMATED: CPT

## 2019-11-26 PROCEDURE — 84443 ASSAY THYROID STIM HORMONE: CPT

## 2019-11-26 PROCEDURE — 82746 ASSAY OF FOLIC ACID SERUM: CPT

## 2019-11-26 PROCEDURE — 83735 ASSAY OF MAGNESIUM: CPT

## 2019-11-26 PROCEDURE — 81001 URINALYSIS AUTO W/SCOPE: CPT

## 2019-11-26 PROCEDURE — 85610 PROTHROMBIN TIME: CPT

## 2019-11-26 PROCEDURE — 85730 THROMBOPLASTIN TIME PARTIAL: CPT

## 2019-11-26 PROCEDURE — 80053 COMPREHEN METABOLIC PANEL: CPT

## 2019-11-26 PROCEDURE — 87206 SMEAR FLUORESCENT/ACID STAI: CPT

## 2019-11-26 PROCEDURE — 87086 URINE CULTURE/COLONY COUNT: CPT

## 2019-11-26 PROCEDURE — 83605 ASSAY OF LACTIC ACID: CPT

## 2019-11-26 PROCEDURE — 97162 PT EVAL MOD COMPLEX 30 MIN: CPT

## 2019-11-26 PROCEDURE — 87116 MYCOBACTERIA CULTURE: CPT

## 2019-11-26 PROCEDURE — 81003 URINALYSIS AUTO W/O SCOPE: CPT

## 2019-11-26 PROCEDURE — 99285 EMERGENCY DEPT VISIT HI MDM: CPT | Mod: 25

## 2019-11-26 PROCEDURE — 87015 SPECIMEN INFECT AGNT CONCNTJ: CPT

## 2019-11-26 PROCEDURE — 36415 COLL VENOUS BLD VENIPUNCTURE: CPT

## 2019-11-26 PROCEDURE — 80061 LIPID PANEL: CPT

## 2019-11-26 RX ORDER — AZITHROMYCIN 500 MG/1
1 TABLET, FILM COATED ORAL
Qty: 3 | Refills: 0
Start: 2019-11-26 | End: 2019-11-28

## 2019-11-26 RX ORDER — CEFUROXIME AXETIL 250 MG
1 TABLET ORAL
Qty: 5 | Refills: 0
Start: 2019-11-26 | End: 2019-11-30

## 2019-11-26 RX ADMIN — Medication 81 MILLIGRAM(S): at 11:53

## 2019-11-26 RX ADMIN — HEPARIN SODIUM 5000 UNIT(S): 5000 INJECTION INTRAVENOUS; SUBCUTANEOUS at 05:50

## 2019-11-26 RX ADMIN — AMLODIPINE BESYLATE 2.5 MILLIGRAM(S): 2.5 TABLET ORAL at 05:50

## 2019-11-26 RX ADMIN — Medication 150 MICROGRAM(S): at 05:50

## 2019-11-26 RX ADMIN — LISINOPRIL 10 MILLIGRAM(S): 2.5 TABLET ORAL at 05:50

## 2019-11-26 RX ADMIN — Medication 5 MILLIGRAM(S): at 11:53

## 2019-11-26 RX ADMIN — BRIMONIDINE TARTRATE 1 DROP(S): 2 SOLUTION/ DROPS OPHTHALMIC at 11:53

## 2019-11-26 NOTE — PROGRESS NOTE ADULT - REASON FOR ADMISSION
Cough and difficulty breathing

## 2019-11-26 NOTE — PROGRESS NOTE ADULT - SUBJECTIVE AND OBJECTIVE BOX
Patient is awake, alert, lying in bed in NAD. D/C planning for today.     INTERVAL HPI/OVERNIGHT EVENTS:      VITAL SIGNS:  T(F): 97.9 (11-26-19 @ 08:15)  HR: 56 (11-26-19 @ 08:15)  BP: 155/44 (11-26-19 @ 08:15)  RR: 16 (11-26-19 @ 08:15)  SpO2: 97% (11-26-19 @ 08:15)  Wt(kg): --  I&O's Detail      REVIEW OF SYSTEMS:    CONSTITUTIONAL:  No fevers, chills, sweats    HEENT:  Eyes:  No diplopia or blurred vision. ENT:  No earache, sore throat or runny nose.    CARDIOVASCULAR:  No pressure, squeezing, tightness, or heaviness about the chest; no palpitations.    RESPIRATORY:  Per HPI    GASTROINTESTINAL:  No abdominal pain, nausea, vomiting or diarrhea.    GENITOURINARY:  No dysuria, frequency or urgency.    NEUROLOGIC:  No paresthesias, fasciculations, seizures or weakness.    PSYCHIATRIC:  No disorder of thought or mood.      PHYSICAL EXAM:    Constitutional: Well developed and nourished  Eyes:Perrla  ENMT: normal  Neck:supple  Respiratory: good air entry  Cardiovascular: S1 S2 regular  Gastrointestinal: Soft, Non tender  Extremities: No edema  Vascular:normal  Neurological:Awake, alert,Ox3  Musculoskeletal:Normal      MEDICATIONS  (STANDING):  amLODIPine   Tablet 2.5 milliGRAM(s) Oral daily  aspirin  chewable 81 milliGRAM(s) Oral daily  azithromycin  IVPB 500 milliGRAM(s) IV Intermittent every 24 hours  brimonidine 0.2% Ophthalmic Solution 1 Drop(s) Both EYES daily  cefTRIAXone   IVPB 1000 milliGRAM(s) IV Intermittent every 24 hours  heparin  Injectable 5000 Unit(s) SubCutaneous every 12 hours  levothyroxine 150 MICROGram(s) Oral daily  lisinopril 10 milliGRAM(s) Oral daily  oxybutynin XL 5 milliGRAM(s) Oral daily    MEDICATIONS  (PRN):  guaiFENesin    Syrup 100 milliGRAM(s) Oral every 6 hours PRN Cough      Allergies    sulfa drugs (Hives)    Intolerances        LABS:      CAPILLARY BLOOD GLUCOSE    RADIOLOGY & ADDITIONAL TESTS:    CXR:    Ct scan chest:    ekg;    echo:

## 2019-11-26 NOTE — PROGRESS NOTE ADULT - SUBJECTIVE AND OBJECTIVE BOX
Patient is a 89y old  Female who presents with a chief complaint of Cough and difficulty breathing (26 Nov 2019 10:18)    pt seen in icu [  ], reg med floor [ x  ], bed [  ], chair at bedside [ x  ], a+o x3 [x  ], lethargic [  ],  nad [ x ]      Allergies    sulfa drugs (Hives)        Vitals    T(F): 97.9 (11-26-19 @ 08:15), Max: 97.9 (11-26-19 @ 08:15)  HR: 56 (11-26-19 @ 08:15) (56 - 68)  BP: 155/44 (11-26-19 @ 08:15) (155/44 - 172/71)  RR: 16 (11-26-19 @ 08:15) (16 - 18)  SpO2: 97% (11-26-19 @ 08:15) (94% - 99%)  Wt(kg): --  CAPILLARY BLOOD GLUCOSE          Labs      .Sputum cup  11-24 @ 13:05   Normal Respiratory Macey present  --    No polymorphonuclear leukocytes per low power field  Few Squamous epithelial cells per low power field  Numerous Gram positive cocci in pairs, chains and clusters per oil power  field  Few Gram Negative Rods per oil power field  Few Gram Negative Diplococci per oil power field      .Urine  11-23 @ 22:21   <10,000 CFU/mL Normal Urogenital Macey  --  --      .Blood  11-23 @ 22:17   No growth to date.  --  --          Radiology Results      Meds    MEDICATIONS  (STANDING):  amLODIPine   Tablet 2.5 milliGRAM(s) Oral daily  aspirin  chewable 81 milliGRAM(s) Oral daily  azithromycin  IVPB 500 milliGRAM(s) IV Intermittent every 24 hours  brimonidine 0.2% Ophthalmic Solution 1 Drop(s) Both EYES daily  cefTRIAXone   IVPB 1000 milliGRAM(s) IV Intermittent every 24 hours  heparin  Injectable 5000 Unit(s) SubCutaneous every 12 hours  levothyroxine 150 MICROGram(s) Oral daily  lisinopril 10 milliGRAM(s) Oral daily  oxybutynin XL 5 milliGRAM(s) Oral daily      MEDICATIONS  (PRN):  guaiFENesin    Syrup 100 milliGRAM(s) Oral every 6 hours PRN Cough      Physical Exam      Neuro :  no focal deficits  Respiratory:  B/L lower lobecrackles  CV: RRR, S1S2, no murmurs,   Abdominal: Soft, NT, ND +BS,  Extremities: No edema, + peripheral pulses    ASSESSMENT    Pneumonia due to organism failed outpt tx  rsv infxn  h/o Arthritis  Hypertension        PLAN      cont roceph and zith   change abx on d/c to ceftin 500mg q12 x 3 days  bronchodilators  blood cx neg noted above   cont guaifenesin prn cough  pulm f/u   cont sup O2  pft outpt  cont current meds   pt stable for d/c

## 2019-11-26 NOTE — DISCHARGE NOTE NURSING/CASE MANAGEMENT/SOCIAL WORK - PATIENT PORTAL LINK FT
You can access the FollowMyHealth Patient Portal offered by [facility] by registering at the following website: [email or web address]. By joining [facility]’s FollowMyHealth portal, you will also be able to view your health information using other applications (apps) compatible with our system.

## 2019-11-28 LAB
CULTURE RESULTS: SIGNIFICANT CHANGE UP
CULTURE RESULTS: SIGNIFICANT CHANGE UP
SPECIMEN SOURCE: SIGNIFICANT CHANGE UP
SPECIMEN SOURCE: SIGNIFICANT CHANGE UP

## 2020-01-15 LAB
CULTURE RESULTS: SIGNIFICANT CHANGE UP
SPECIMEN SOURCE: SIGNIFICANT CHANGE UP

## 2020-07-27 NOTE — ED ADULT NURSE NOTE - NSHOSCREENINGQ1_ED_ALL_ED
Clinic Administered Medication Documentation      Injectable Medication Documentation    Patient was given Cyanocobalamin (B-12). Prior to medication administration, verified patients identity using patient s name and date of birth. Please see MAR and medication order for additional information. Patient instructed to remain in clinic for 15 minutes and report any adverse reaction to staff immediately .      Was entire vial of medication used? Yes  Vial/Syringe: Single dose vial  Expiration Date:  07/2021  Given in right deltoid.  Was this medication supplied by the patient? No   Piper ONEILL CMA ([facility])      
90
No

## 2024-03-19 PROBLEM — M19.90 UNSPECIFIED OSTEOARTHRITIS, UNSPECIFIED SITE: Chronic | Status: ACTIVE | Noted: 2019-11-23

## 2024-03-19 PROBLEM — I10 ESSENTIAL (PRIMARY) HYPERTENSION: Chronic | Status: ACTIVE | Noted: 2019-11-23

## 2024-04-26 NOTE — ED ADULT NURSE NOTE - INTERVENTIONS DEFINITIONS
Patient scheduled for EXCISION AND INTERMEDIATE CLOSURE RIGHT NECK LESION on 5/15/24 at [facility].    Provide visual clues: red socks/Instruct patient to call for assistance/Stretcher in lowest position, wheels locked, appropriate side rails in place

## 2024-05-03 ENCOUNTER — APPOINTMENT (OUTPATIENT)
Dept: NEUROSURGERY | Facility: CLINIC | Age: 89
End: 2024-05-03